# Patient Record
Sex: FEMALE | Race: BLACK OR AFRICAN AMERICAN | Employment: FULL TIME | URBAN - METROPOLITAN AREA
[De-identification: names, ages, dates, MRNs, and addresses within clinical notes are randomized per-mention and may not be internally consistent; named-entity substitution may affect disease eponyms.]

---

## 2020-11-03 ENCOUNTER — HOSPITAL ENCOUNTER (OUTPATIENT)
Dept: GENERAL RADIOLOGY | Age: 35
Discharge: HOME | End: 2020-11-03
Payer: COMMERCIAL

## 2020-11-03 PROCEDURE — 6360000004 HC RX CONTRAST MEDICATION: Performed by: OBSTETRICS & GYNECOLOGY

## 2020-11-03 PROCEDURE — 58340 CATHETER FOR HYSTEROGRAPHY: CPT

## 2020-11-03 RX ADMIN — IOVERSOL 50 ML: 509 INJECTION INTRA-ARTERIAL; INTRAVENOUS at 10:46

## 2021-02-02 NOTE — PROCEDURES
The acorn catheter tip removed from the cervix. The single-tooth  tenaculum was removed and the speculum was removed. The patient was given postprocedural instructions by myself.         Leann Arceo MD    D:02/02/2021 9:16:23               T: 02/02/2021 13:52:41      [id number]  Job#: [id number]     Doc#: [id number]    CC: